# Patient Record
Sex: FEMALE | Race: WHITE | NOT HISPANIC OR LATINO | Employment: FULL TIME | URBAN - METROPOLITAN AREA
[De-identification: names, ages, dates, MRNs, and addresses within clinical notes are randomized per-mention and may not be internally consistent; named-entity substitution may affect disease eponyms.]

---

## 2018-01-25 ENCOUNTER — HOSPITAL ENCOUNTER (OUTPATIENT)
Dept: RADIOLOGY | Facility: HOSPITAL | Age: 48
Discharge: HOME | End: 2018-01-25
Attending: PHYSICIAN ASSISTANT
Payer: COMMERCIAL

## 2018-01-25 ENCOUNTER — OFFICE VISIT (OUTPATIENT)
Dept: URGENT CARE | Facility: CLINIC | Age: 48
End: 2018-01-25
Payer: COMMERCIAL

## 2018-01-25 VITALS
HEART RATE: 112 BPM | BODY MASS INDEX: 31.49 KG/M2 | WEIGHT: 189 LBS | DIASTOLIC BLOOD PRESSURE: 80 MMHG | SYSTOLIC BLOOD PRESSURE: 106 MMHG | TEMPERATURE: 99 F | HEIGHT: 65 IN | OXYGEN SATURATION: 98 %

## 2018-01-25 DIAGNOSIS — R06.02 SHORTNESS OF BREATH: Primary | ICD-10-CM

## 2018-01-25 DIAGNOSIS — R06.02 SHORTNESS OF BREATH: ICD-10-CM

## 2018-01-25 PROCEDURE — 93005 ELECTROCARDIOGRAM TRACING: CPT | Mod: S$GLB,,, | Performed by: PHYSICIAN ASSISTANT

## 2018-01-25 PROCEDURE — 3008F BODY MASS INDEX DOCD: CPT | Mod: S$GLB,,, | Performed by: PHYSICIAN ASSISTANT

## 2018-01-25 PROCEDURE — 99214 OFFICE O/P EST MOD 30 MIN: CPT | Mod: S$GLB,,, | Performed by: PHYSICIAN ASSISTANT

## 2018-01-25 PROCEDURE — 71046 X-RAY EXAM CHEST 2 VIEWS: CPT | Mod: 26,,, | Performed by: RADIOLOGY

## 2018-01-25 PROCEDURE — 99999 PR PBB SHADOW E&M-NEW PATIENT-LVL III: CPT | Mod: PBBFAC,,, | Performed by: PHYSICIAN ASSISTANT

## 2018-01-25 PROCEDURE — 93010 ELECTROCARDIOGRAM REPORT: CPT | Mod: S$GLB,,, | Performed by: INTERNAL MEDICINE

## 2018-01-25 PROCEDURE — 71046 X-RAY EXAM CHEST 2 VIEWS: CPT | Mod: TC,FY,PO

## 2018-01-25 RX ORDER — ALBUTEROL SULFATE 90 UG/1
2 AEROSOL, METERED RESPIRATORY (INHALATION) EVERY 6 HOURS PRN
Qty: 1 INHALER | Refills: 0 | Status: SHIPPED | OUTPATIENT
Start: 2018-01-25 | End: 2018-01-31

## 2018-01-26 NOTE — PROGRESS NOTES
"Subjective:      Patient ID: Jessenia Weiner is a 47 y.o. female.    Chief Complaint: Shortness of Breath (chest tightness)    Sick over the weekend- body aches, diaphoresis, fever (101/102), coughing, HA, sinus...these symptoms have since mostly resolved    Patient with + cardiac history (SVT and MI secondary to SVT)    Patient has appointment next Wednesday to establish care with Dr. Capps      Shortness of Breath   This is a new problem. The current episode started in the past 7 days (in the past 2 days). Associated symptoms include headaches (h/o TMJ, chronic). Pertinent negatives include no abdominal pain, chest pain, fever, leg swelling, rhinorrhea, sore throat, vomiting or wheezing. Exacerbated by: Smokes 1pk/day for 28yrs. There is no history of asthma, COPD or pneumonia. (Sinus infections/bronchitis)     Review of Systems   Constitutional: Positive for chills (mild) and diaphoresis (mild). Negative for fever.   HENT: Positive for sinus pain (mild) and sinus pressure (mild). Negative for congestion, rhinorrhea and sore throat.    Eyes: Negative for visual disturbance.   Respiratory: Positive for chest tightness (when taking a breath, pressure in her back) and shortness of breath. Negative for cough and wheezing.    Cardiovascular: Negative for chest pain, palpitations and leg swelling.   Gastrointestinal: Negative for abdominal pain, constipation, diarrhea, nausea and vomiting.   Neurological: Positive for headaches (h/o TMJ, chronic). Negative for dizziness, speech difficulty (trouble getting enough air to get words out), weakness, light-headedness and numbness.   Psychiatric/Behavioral: Negative for confusion. The patient is nervous/anxious.        Objective:   /80   Pulse (!) 112   Temp 99.2 °F (37.3 °C) (Tympanic)   Ht 5' 5" (1.651 m)   Wt 85.7 kg (189 lb)   LMP  (LMP Unknown)   SpO2 98%   BMI 31.45 kg/m²   Physical Exam   Constitutional: She appears well-developed and well-nourished. She does " not appear ill. No distress.   HENT:   Head: Normocephalic and atraumatic.   Right Ear: Tympanic membrane and ear canal normal. Tympanic membrane is not erythematous. No middle ear effusion.   Left Ear: Tympanic membrane and ear canal normal. Tympanic membrane is not erythematous.  No middle ear effusion.   Nose: Nose normal. Right sinus exhibits no maxillary sinus tenderness and no frontal sinus tenderness. Left sinus exhibits no maxillary sinus tenderness and no frontal sinus tenderness.   Mouth/Throat: Uvula is midline and oropharynx is clear and moist.   Cardiovascular: Normal rate, regular rhythm and normal heart sounds.    No murmur heard.  Pulmonary/Chest: Effort normal and breath sounds normal. No respiratory distress. She has no decreased breath sounds. She has no wheezes. She has no rhonchi. She has no rales.   Lymphadenopathy:     She has no cervical adenopathy.   Skin: Skin is warm and dry. No rash noted. She is not diaphoretic.   Psychiatric: Her speech is normal and behavior is normal. Thought content normal. Her mood appears anxious.     Assessment:      1. Shortness of breath       Plan:   Shortness of breath  -     X-Ray Chest PA And Lateral; Future; Expected date: 01/25/2018  -     EKG 12-lead; Future  -     albuterol 90 mcg/actuation inhaler; Inhale 2 puffs into the lungs every 6 (six) hours as needed for Wheezing.  Dispense: 1 Inhaler; Refill: 0    Fairly unremarkable EKG and CXR    Gave handout on shortness of breath.  Printed AVS and reviewed treatment plan in detail.    Discussed worsening signs/symptoms and when to return to clinic or go to ED.   Patient expresses understanding and agrees with treatment plan.

## 2018-01-26 NOTE — PATIENT INSTRUCTIONS

## 2018-01-31 ENCOUNTER — LAB VISIT (OUTPATIENT)
Dept: LAB | Facility: HOSPITAL | Age: 48
End: 2018-01-31
Attending: FAMILY MEDICINE
Payer: COMMERCIAL

## 2018-01-31 ENCOUNTER — OFFICE VISIT (OUTPATIENT)
Dept: INTERNAL MEDICINE | Facility: CLINIC | Age: 48
End: 2018-01-31
Payer: COMMERCIAL

## 2018-01-31 VITALS
SYSTOLIC BLOOD PRESSURE: 140 MMHG | HEART RATE: 84 BPM | TEMPERATURE: 99 F | BODY MASS INDEX: 31.89 KG/M2 | WEIGHT: 191.38 LBS | HEIGHT: 65 IN | DIASTOLIC BLOOD PRESSURE: 86 MMHG

## 2018-01-31 DIAGNOSIS — Z72.0 TOBACCO ABUSE: ICD-10-CM

## 2018-01-31 DIAGNOSIS — R53.83 FATIGUE, UNSPECIFIED TYPE: ICD-10-CM

## 2018-01-31 DIAGNOSIS — F33.2 SEVERE EPISODE OF RECURRENT MAJOR DEPRESSIVE DISORDER, WITHOUT PSYCHOTIC FEATURES: ICD-10-CM

## 2018-01-31 DIAGNOSIS — F41.1 GAD (GENERALIZED ANXIETY DISORDER): ICD-10-CM

## 2018-01-31 DIAGNOSIS — R06.02 SOB (SHORTNESS OF BREATH): Primary | ICD-10-CM

## 2018-01-31 DIAGNOSIS — R06.02 SHORTNESS OF BREATH: ICD-10-CM

## 2018-01-31 DIAGNOSIS — R06.02 SOB (SHORTNESS OF BREATH): ICD-10-CM

## 2018-01-31 DIAGNOSIS — R07.9 CHEST PAIN, UNSPECIFIED TYPE: ICD-10-CM

## 2018-01-31 LAB
ALBUMIN SERPL BCP-MCNC: 3.7 G/DL
ALP SERPL-CCNC: 79 U/L
ALT SERPL W/O P-5'-P-CCNC: 39 U/L
ANION GAP SERPL CALC-SCNC: 12 MMOL/L
AST SERPL-CCNC: 27 U/L
BASOPHILS # BLD AUTO: 0.07 K/UL
BASOPHILS NFR BLD: 0.9 %
BILIRUB SERPL-MCNC: 0.1 MG/DL
BUN SERPL-MCNC: 8 MG/DL
CALCIUM SERPL-MCNC: 10 MG/DL
CHLORIDE SERPL-SCNC: 105 MMOL/L
CHOLEST SERPL-MCNC: 210 MG/DL
CHOLEST/HDLC SERPL: 3 {RATIO}
CK MB SERPL-MCNC: 0.7 NG/ML
CK MB SERPL-RTO: 0.9 %
CK SERPL-CCNC: 75 U/L
CO2 SERPL-SCNC: 26 MMOL/L
CREAT SERPL-MCNC: 0.7 MG/DL
CRP SERPL-MCNC: 1.61 MG/L
DIFFERENTIAL METHOD: ABNORMAL
EOSINOPHIL # BLD AUTO: 0.2 K/UL
EOSINOPHIL NFR BLD: 2.3 %
ERYTHROCYTE [DISTWIDTH] IN BLOOD BY AUTOMATED COUNT: 12.7 %
EST. GFR  (AFRICAN AMERICAN): >60 ML/MIN/1.73 M^2
EST. GFR  (NON AFRICAN AMERICAN): >60 ML/MIN/1.73 M^2
ESTIMATED AVG GLUCOSE: 82 MG/DL
GLUCOSE SERPL-MCNC: 71 MG/DL
HBA1C MFR BLD HPLC: 4.5 %
HCT VFR BLD AUTO: 40.4 %
HDLC SERPL-MCNC: 69 MG/DL
HDLC SERPL: 32.9 %
HGB BLD-MCNC: 13.6 G/DL
IMM GRANULOCYTES # BLD AUTO: 0.02 K/UL
IMM GRANULOCYTES NFR BLD AUTO: 0.3 %
LDLC SERPL CALC-MCNC: 95.8 MG/DL
LYMPHOCYTES # BLD AUTO: 2.7 K/UL
LYMPHOCYTES NFR BLD: 35.8 %
MCH RBC QN AUTO: 31.7 PG
MCHC RBC AUTO-ENTMCNC: 33.7 G/DL
MCV RBC AUTO: 94 FL
MONOCYTES # BLD AUTO: 0.9 K/UL
MONOCYTES NFR BLD: 11.9 %
NEUTROPHILS # BLD AUTO: 3.6 K/UL
NEUTROPHILS NFR BLD: 48.8 %
NONHDLC SERPL-MCNC: 141 MG/DL
NRBC BLD-RTO: 0 /100 WBC
PLATELET # BLD AUTO: 362 K/UL
PMV BLD AUTO: 11.2 FL
POTASSIUM SERPL-SCNC: 3.6 MMOL/L
PROT SERPL-MCNC: 7.1 G/DL
RBC # BLD AUTO: 4.29 M/UL
SODIUM SERPL-SCNC: 143 MMOL/L
T4 FREE SERPL-MCNC: 0.85 NG/DL
TRIGL SERPL-MCNC: 226 MG/DL
TSH SERPL DL<=0.005 MIU/L-ACNC: 1.93 UIU/ML
WBC # BLD AUTO: 7.41 K/UL

## 2018-01-31 PROCEDURE — 80053 COMPREHEN METABOLIC PANEL: CPT

## 2018-01-31 PROCEDURE — 83036 HEMOGLOBIN GLYCOSYLATED A1C: CPT

## 2018-01-31 PROCEDURE — 99999 PR PBB SHADOW E&M-EST. PATIENT-LVL III: CPT | Mod: PBBFAC,,, | Performed by: FAMILY MEDICINE

## 2018-01-31 PROCEDURE — 80061 LIPID PANEL: CPT

## 2018-01-31 PROCEDURE — 36415 COLL VENOUS BLD VENIPUNCTURE: CPT | Mod: PO

## 2018-01-31 PROCEDURE — 84439 ASSAY OF FREE THYROXINE: CPT

## 2018-01-31 PROCEDURE — 84443 ASSAY THYROID STIM HORMONE: CPT

## 2018-01-31 PROCEDURE — 3008F BODY MASS INDEX DOCD: CPT | Mod: S$GLB,,, | Performed by: FAMILY MEDICINE

## 2018-01-31 PROCEDURE — 85025 COMPLETE CBC W/AUTO DIFF WBC: CPT

## 2018-01-31 PROCEDURE — 82553 CREATINE MB FRACTION: CPT

## 2018-01-31 PROCEDURE — 99215 OFFICE O/P EST HI 40 MIN: CPT | Mod: S$GLB,,, | Performed by: FAMILY MEDICINE

## 2018-01-31 PROCEDURE — 86141 C-REACTIVE PROTEIN HS: CPT

## 2018-01-31 RX ORDER — BUPROPION HYDROCHLORIDE 300 MG/1
300 TABLET ORAL DAILY
Qty: 30 TABLET | Refills: 11 | Status: SHIPPED | OUTPATIENT
Start: 2018-02-07 | End: 2019-02-07

## 2018-01-31 RX ORDER — LORAZEPAM 0.5 MG/1
0.5 TABLET ORAL EVERY 12 HOURS PRN
Qty: 40 TABLET | Refills: 0 | Status: SHIPPED | OUTPATIENT
Start: 2018-01-31 | End: 2018-03-08 | Stop reason: SDUPTHER

## 2018-01-31 RX ORDER — BUPROPION HYDROCHLORIDE 150 MG/1
150 TABLET ORAL DAILY
Qty: 7 TABLET | Refills: 0 | Status: SHIPPED | OUTPATIENT
Start: 2018-01-31 | End: 2018-02-07

## 2018-01-31 NOTE — PROGRESS NOTES
Subjective:      Patient ID: Jessenia Weiner is a 47 y.o. female.    Chief Complaint: Establish Care; Medication Refill; and Follow-up (from  - for chest tightness and SOB)    Disclaimer:  This note is prepared using voice recognition software and as such is likely to have errors and has not been proof read. Please contact me for questions.     Jessenia Weiner is a 47 y.o. female who presents today to establish care.  She was recently seen in urgent care on the  due to some shortness of breath and chest discomfort.  He was right after a significant event of dealing with having to fire someone that was very stressful.  She does not have a current PCP.  She moved from [place] about 3 years ago.  From Dr. Carrasco in [place] for about 2 years now she's currently working in [employer].  She's the  per Luv Rink.  She's been referred by another patient of mine.    She has a history of anxiety and depression.  She's been on multiple agents that the most recent regimen with Cymbalta at 90 mg and Wellbutrin 300 mg XL.  She does have a history of SVT as well which she's had 2 episodes one in  another in .  She also had 2 cardiac ablations performed.  At one point she was placed on metoprolol but this made her very sleepy.  Her   right before the episode in .  She doesn't really have any copies of her records.  She states she lost all of it in her home in [place].  She does not have a current cardiologist.    She reports her mother is a psychologist but she does not speak with her.  Her father's in [place] but she doesn't really have a greatly strep with him either.  She has really no support system ever since her  .  This becomes difficult for her.  She feels [place] she is overly anxious always stressed with a high stress job and depressed as well.  She feels like she needs something for the anxiety as well as for the depression.  They did do an EKG on  "the 25th which showed some left atrial enlargement which would go along with some of her SVT issues and evidence of a possible old MI.  She does report that this was noted before on EKG but she's never actually had a stress test.  She is a smoker receive one pack per day but now working down to half a pack per day.  She would be interested in quitting at some point but she's not currently.  She hasn't had any blood work performed of cholesterol levels her thyroid studies either.  She's uncertain if she's diabetic.  She basically is willing to do whatever it takes to get her to feel "normal again".  She is not suicidal or homicidal.        No results found for: WBC, HGB, HCT, PLT, CHOL, TRIG, HDL, LDLDIRECT, ALT, AST, NA, K, CL, CREATININE, BUN, CO2, TSH, PSA, INR, GLUF, HGBA1C, MICROALBUR    Review of Systems   Constitutional: Positive for fatigue. Negative for activity change and unexpected weight change.   HENT: Negative for congestion, hearing loss, rhinorrhea and trouble swallowing.    Eyes: Negative for discharge and visual disturbance.   Respiratory: Positive for chest tightness and shortness of breath. Negative for wheezing.    Cardiovascular: Positive for chest pain. Negative for palpitations.   Gastrointestinal: Negative for blood in stool, constipation, diarrhea and vomiting.   Endocrine: Negative for polydipsia and polyuria.   Genitourinary: Negative for difficulty urinating, dysuria, hematuria and menstrual problem.   Musculoskeletal: Positive for arthralgias. Negative for joint swelling and neck pain.   Neurological: Positive for headaches. Negative for weakness.   Psychiatric/Behavioral: Positive for agitation, dysphoric mood and sleep disturbance. Negative for confusion. The patient is nervous/anxious.      Objective:     Vitals:    01/31/18 1308   BP: (!) 140/86   Pulse: 84   Temp: 99.4 °F (37.4 °C)   TempSrc: Tympanic   Weight: 86.8 kg (191 lb 5.8 oz)   Height: 5' 5" (1.651 m)     Physical Exam "   Constitutional: She appears well-developed and well-nourished.   HENT:   Head: Normocephalic and atraumatic.   Right Ear: Tympanic membrane normal.   Left Ear: Tympanic membrane normal.   Mouth/Throat: Oropharynx is clear and moist.   Eyes: Conjunctivae and EOM are normal.   Neck: Normal range of motion. Neck supple.   Cardiovascular: Normal rate and regular rhythm.    Pulmonary/Chest: Effort normal and breath sounds normal. She has no wheezes.   Psychiatric: Her speech is normal and behavior is normal. Judgment and thought content normal. Her mood appears anxious. Cognition and memory are normal. She exhibits a depressed mood.   Nursing note and vitals reviewed.    Assessment:     1. SOB (shortness of breath)    2. Chest pain, unspecified type    3. MALIK (generalized anxiety disorder)    4. Severe episode of recurrent major depressive disorder, without psychotic features    5. Fatigue, unspecified type    6. Tobacco abuse    7. Shortness of breath      Plan:   Jessenia was seen today for establish care, medication refill and follow-up.    Diagnoses and all orders for this visit:    SOB (shortness of breath) new, needing workup.  Previous workup done at the urgent care was chest x-ray and EKG.  Chest x-ray showed no active infiltrates or acute issues.  EKG showed left atrial enlargement and an old MI.  At this time we'll go ahead and obtain additional lab work to rule out thyroid anemia electrolyte disturbances.  We'll also obtain high sensitivity CRP.  We'll perform nuclear stress test that she's at high risk for possible coronary artery disease.  Based on the results we'll follow her up and determine if she needs to proceed forward with cardiology.  In the meantime for some the anxiety related to the stress for the shortest breath I will give her Ativan to use up to twice daily.  We'll get her back started on her Wellbutrin.  -     TSH; Future  -     T4, free; Future  -     Cancel: Microalbumin/creatinine urine  ratio  -     Lipid panel; Future  -     Hemoglobin A1c; Future  -     Comprehensive metabolic panel; Future  -     CBC auto differential; Future  -     High sensitivity CRP (Cardiac CRP); Future  -     CK-MB; Future  -     NM Multi Pharm Stress Cardiac Component; Future  -     Cancel: Microalbumin/creatinine urine ratio  -     Microalbumin/creatinine urine ratio; Future    Chest pain, unspecified type-new ongoing.  History of an old MI.  Obtain lab work.  Set up stress test due to her history of an abnormal EKG.  Recommend a cardiology however she like to hold off and like to get the lab results back first and see the stress results first.  Also suspected component of anxiety  -     TSH; Future  -     T4, free; Future  -     Cancel: Microalbumin/creatinine urine ratio  -     Lipid panel; Future  -     Hemoglobin A1c; Future  -     Comprehensive metabolic panel; Future  -     CBC auto differential; Future  -     High sensitivity CRP (Cardiac CRP); Future  -     CK-MB; Future  -     NM Multi Pharm Stress Cardiac Component; Future  -     Cancel: Microalbumin/creatinine urine ratio  -     Microalbumin/creatinine urine ratio; Future    MALIK (generalized anxiety disorder)-known history of worse lately with high stress job.  Restart Wellbutrin initially 150 for one week then increase up to 300 mg.  Provided Ativan 0.5 mg tablets up to twice a day dosing for now.  She may also need to get back on Cymbalta but she reports that it was expensive.  Could consider an alternative such as Effexor or Lexapro.  Patient will message me to the patient portal once labs are received.  -     TSH; Future  -     T4, free; Future  -     Cancel: Microalbumin/creatinine urine ratio  -     Lipid panel; Future  -     Hemoglobin A1c; Future  -     Comprehensive metabolic panel; Future  -     CBC auto differential; Future  -     High sensitivity CRP (Cardiac CRP); Future  -     CK-MB; Future  -     NM Multi Pharm Stress Cardiac Component;  Future  -     Cancel: Microalbumin/creatinine urine ratio  -     Microalbumin/creatinine urine ratio; Future    Severe episode of recurrent major depressive disorder, without psychotic features-worse lately high stress jobmay just support system.  Recommend counselor.  Obtain lab work.  Restart Wellbutrin.  Consideration of adding back either Cymbalta or Lexapro or Effexor  -     TSH; Future  -     T4, free; Future  -     Cancel: Microalbumin/creatinine urine ratio  -     Lipid panel; Future  -     Hemoglobin A1c; Future  -     Comprehensive metabolic panel; Future  -     CBC auto differential; Future  -     High sensitivity CRP (Cardiac CRP); Future  -     CK-MB; Future  -     NM Multi Pharm Stress Cardiac Component; Future  -     Cancel: Microalbumin/creatinine urine ratio  -     Microalbumin/creatinine urine ratio; Future    Fatigue, unspecified type-new needing workup obtain labs discussed high stress job.  Rule out heart attack events.  -     TSH; Future  -     T4, free; Future  -     Cancel: Microalbumin/creatinine urine ratio  -     Lipid panel; Future  -     Hemoglobin A1c; Future  -     Comprehensive metabolic panel; Future  -     CBC auto differential; Future  -     High sensitivity CRP (Cardiac CRP); Future  -     CK-MB; Future  -     NM Multi Pharm Stress Cardiac Component; Future  -     Cancel: Microalbumin/creatinine urine ratio  -     Microalbumin/creatinine urine ratio; Future    Tobacco abuse-patient needing to quit smoking.  We'll restart Wellbutrin.  Currently gone down from 1 pack to a half a pack.  But at higher risk for coronary artery events.  -     TSH; Future  -     T4, free; Future  -     Cancel: Microalbumin/creatinine urine ratio  -     Lipid panel; Future  -     Hemoglobin A1c; Future  -     Comprehensive metabolic panel; Future  -     CBC auto differential; Future  -     High sensitivity CRP (Cardiac CRP); Future  -     CK-MB; Future  -     NM Multi Pharm Stress Cardiac Component;  Future  -     Cancel: Microalbumin/creatinine urine ratio  -     Microalbumin/creatinine urine ratio; Future      Other orders  -     buPROPion (WELLBUTRIN XL) 150 MG TB24 tablet; Take 1 tablet (150 mg total) by mouth once daily.  -     buPROPion (WELLBUTRIN XL) 300 MG 24 hr tablet; Take 1 tablet (300 mg total) by mouth once daily.  -     LORazepam (ATIVAN) 0.5 MG tablet; Take 1 tablet (0.5 mg total) by mouth every 12 (twelve) hours as needed for Anxiety.      Time spent: 45 minutes in face to face discussion concerning diagnosis, prognosis, review of lab and test results, benefits of treatment as well as management of disease, counseling of patient and coordination of care between various health care providers . Greater than half the time spent was used for coordination of care and counseling of patient.         Follow-up in about 5 weeks (around 3/7/2018), or meds, anxiety, labs. .

## 2018-02-01 ENCOUNTER — TELEPHONE (OUTPATIENT)
Dept: INTERNAL MEDICINE | Facility: CLINIC | Age: 48
End: 2018-02-01

## 2018-02-01 NOTE — TELEPHONE ENCOUNTER
Patient was here to est care with  I called to follow up and patient expressed a great visit and is highly likely to refer Dr. Capps to others.aa

## 2018-02-26 ENCOUNTER — PATIENT OUTREACH (OUTPATIENT)
Dept: ADMINISTRATIVE | Facility: HOSPITAL | Age: 48
End: 2018-02-26

## 2018-03-08 ENCOUNTER — OFFICE VISIT (OUTPATIENT)
Dept: INTERNAL MEDICINE | Facility: CLINIC | Age: 48
End: 2018-03-08
Payer: COMMERCIAL

## 2018-03-08 VITALS
DIASTOLIC BLOOD PRESSURE: 88 MMHG | TEMPERATURE: 99 F | SYSTOLIC BLOOD PRESSURE: 120 MMHG | HEIGHT: 65 IN | HEART RATE: 86 BPM | WEIGHT: 191.56 LBS | BODY MASS INDEX: 31.92 KG/M2

## 2018-03-08 DIAGNOSIS — Z23 NEED FOR PNEUMOCOCCAL VACCINATION: ICD-10-CM

## 2018-03-08 DIAGNOSIS — Z23 NEED FOR TDAP VACCINATION: ICD-10-CM

## 2018-03-08 DIAGNOSIS — F33.1 MODERATE EPISODE OF RECURRENT MAJOR DEPRESSIVE DISORDER: ICD-10-CM

## 2018-03-08 DIAGNOSIS — F41.1 GAD (GENERALIZED ANXIETY DISORDER): ICD-10-CM

## 2018-03-08 DIAGNOSIS — Z00.00 ROUTINE GENERAL MEDICAL EXAMINATION AT A HEALTH CARE FACILITY: Primary | ICD-10-CM

## 2018-03-08 DIAGNOSIS — Z72.0 TOBACCO ABUSE: ICD-10-CM

## 2018-03-08 PROCEDURE — 99396 PREV VISIT EST AGE 40-64: CPT | Mod: 25,S$GLB,, | Performed by: FAMILY MEDICINE

## 2018-03-08 PROCEDURE — 90471 IMMUNIZATION ADMIN: CPT | Mod: S$GLB,,, | Performed by: FAMILY MEDICINE

## 2018-03-08 PROCEDURE — 90715 TDAP VACCINE 7 YRS/> IM: CPT | Mod: S$GLB,,, | Performed by: FAMILY MEDICINE

## 2018-03-08 PROCEDURE — 90732 PPSV23 VACC 2 YRS+ SUBQ/IM: CPT | Mod: S$GLB,,, | Performed by: FAMILY MEDICINE

## 2018-03-08 PROCEDURE — 90472 IMMUNIZATION ADMIN EACH ADD: CPT | Mod: S$GLB,,, | Performed by: FAMILY MEDICINE

## 2018-03-08 PROCEDURE — 99999 PR PBB SHADOW E&M-EST. PATIENT-LVL III: CPT | Mod: PBBFAC,,, | Performed by: FAMILY MEDICINE

## 2018-03-08 RX ORDER — LORAZEPAM 0.5 MG/1
0.5 TABLET ORAL EVERY 12 HOURS PRN
Qty: 60 TABLET | Refills: 5 | Status: SHIPPED | OUTPATIENT
Start: 2018-03-08

## 2018-03-08 NOTE — PROGRESS NOTES
Subjective:      Patient ID: Jsesenia Weiner is a 47 y.o. female.    Chief Complaint: Follow-up (labs, sob, chest pain) and Anxiety    Disclaimer:  This note is prepared using voice recognition software and as such is likely to have errors and has not been proof read. Please contact me for questions.     Patient's coming in today for follow-up since her last visit which was 5 weeks ago.  At that time she was having a lot of shortness of breath and anxiety issues and chest pain.  At that time she reports she had a known history of anxiety and depression her entire life but recently had gotten much worse.  She is under a lot of high stress events.  She's also a chronic smoker 1 pack per day.  At that time we did do blood work which an EKG and chest x-ray.  Chest x-ray showed no evidence of infiltrates or infection no fluid in the lungs.  EKG showed normal sinus rhythm other than mildly left atrial enlargement noted.  Blood work showed normal cholesterol other than slightly elevated triglycerides which at the time the lab work was not fasting.  Kidney function liver test blood counts were normal except for mildly elevated platelets in the upper 350s which is most likely consistent with her tobacco use.  High-sensitivity CRP was normal.  Cardiac enzymes were normal.  The patient will like to hold off on doing any type of nuclear stress testing because she states she can't financially afford it.  She also like to hold off on doing any urine testing.  She does find that her depression has improved with using the Wellbutrin at 300 mg.  The anxiety has gotten much better with the use of Ativan.  Initially she did the one pill twice a day and then moved to just once daily.  She finds the Ativan helped in the morning but it seems to wear off after 4:00.  She would be interested if she could have something else to help with anxiety.  She mainly would like to stick with the Ativan but just see if it can last longer.  We did  discuss potential of her just using it twice a day which I'm okay with.  She's interested also weight loss but she reports mainly she eats out of boredom.  She would not be a candidate at this time for the phentermine due to the anxiety and the smoking history.  In the past she states she's been on higher doses of Wellbutrin also but at this time she's early on finding out if the 300 mg will be enough.  We also did discuss contrary but at this time I think if she can improve with her depression and anxiety and get more motivated with her goals of possibly starting school she'll do better as well.  She sounded like this appeared to be a good plan for her.    From a prevention standpoint she does need a Pap smear mammogram.  She wants to hold off on doing a Pap smear today as she states her last was in 2010.  At that time it was abnormal and she's had a colposcopy and freezing and even uterine ablations.  She's not had one since.  She's also due for mammogram.  She like this can see about scheduling these in the future.  She is willing to do her pneumonia shot today being a smoker and her T dap.        Lab Results   Component Value Date    WBC 7.41 01/31/2018    HGB 13.6 01/31/2018    HCT 40.4 01/31/2018     (H) 01/31/2018    CHOL 210 (H) 01/31/2018    TRIG 226 (H) 01/31/2018    HDL 69 01/31/2018    ALT 39 01/31/2018    AST 27 01/31/2018     01/31/2018    K 3.6 01/31/2018     01/31/2018    CREATININE 0.7 01/31/2018    BUN 8 01/31/2018    CO2 26 01/31/2018    TSH 1.929 01/31/2018    HGBA1C 4.5 01/31/2018       Review of Systems   Constitutional: Negative for activity change and unexpected weight change.   HENT: Negative for hearing loss, rhinorrhea and trouble swallowing.    Eyes: Negative for discharge and visual disturbance.   Respiratory: Positive for chest tightness. Negative for wheezing.    Cardiovascular: Negative for chest pain and palpitations.   Gastrointestinal: Negative for blood in  "stool, constipation, diarrhea and vomiting.   Endocrine: Negative for polydipsia and polyuria.   Genitourinary: Negative for difficulty urinating, dysuria, hematuria and menstrual problem.   Musculoskeletal: Negative for arthralgias, joint swelling and neck pain.   Neurological: Positive for headaches. Negative for weakness.   Psychiatric/Behavioral: Positive for dysphoric mood. Negative for confusion.     Objective:     Vitals:    03/08/18 1451   BP: 120/88   Pulse: 86   Temp: 98.9 °F (37.2 °C)   Weight: 86.9 kg (191 lb 9.3 oz)   Height: 5' 5" (1.651 m)     Physical Exam   Constitutional: She appears well-developed and well-nourished.   HENT:   Head: Normocephalic and atraumatic.   Right Ear: Tympanic membrane normal.   Left Ear: Tympanic membrane normal.   Mouth/Throat: Oropharynx is clear and moist.   Eyes: Conjunctivae and EOM are normal.   Neck: Normal range of motion. Neck supple.   Cardiovascular: Normal rate and regular rhythm.    Pulmonary/Chest: Effort normal and breath sounds normal.   Abdominal: Soft. Bowel sounds are normal. She exhibits no distension and no mass. There is no tenderness. There is no guarding.   Psychiatric: Her behavior is normal. Her mood appears anxious. Her speech is rapid and/or pressured. Cognition and memory are normal.   Nursing note and vitals reviewed.    Assessment:     1. Routine general medical examination at a health care facility    2. MALIK (generalized anxiety disorder)    3. Moderate episode of recurrent major depressive disorder    4. Need for pneumococcal vaccination    5. Need for Tdap vaccination      Plan:   Jessenia was seen today for follow-up and anxiety.    Diagnoses and all orders for this visit:    Routine general medical examination at a health care facility-labs reviewed discussed health maintenance issues she will schedule her mammogram.  We'll schedule her Pap smear for about 5 months.  She'll get her pneumonia vaccine and her T dap vaccine today.  She wants to " cancel the nuclear stress and the urine testing today mainly due to cost.    MALIK (generalized anxiety disorder)-increase Ativan to twice a day dosing continue with Wellbutrin 300 mg.  Follow-up in 4 weeks.  The patient portal.    Moderate episode of recurrent major depressive disorder-improved with Wellbutrin 300 mg follow-up the patient portal in 4 weeks.  If noted to still be more depressed at that time could consider increasing to 450    Need for pneumococcal vaccination-update today    Need for Tdap vaccination-update today due to the patient working as a     Tobacco abuse-patient's may be interested in working on quitting smoking however at this time she's been a hold off just trying to cut back some.    Other orders  -     LORazepam (ATIVAN) 0.5 MG tablet; Take 1 tablet (0.5 mg total) by mouth every 12 (twelve) hours as needed for Anxiety.  -     Pneumococcal Polysaccharide Vaccine (23 Valent) (SQ/IM)  -     (In Office Administered) Tdap Vaccine            Follow-up in about 5 months (around 8/8/2018) for pap and med refills.

## 2018-06-22 DIAGNOSIS — Z12.39 BREAST CANCER SCREENING: ICD-10-CM

## 2019-09-20 ENCOUNTER — PATIENT OUTREACH (OUTPATIENT)
Dept: ADMINISTRATIVE | Facility: HOSPITAL | Age: 49
End: 2019-09-20

## 2019-10-16 ENCOUNTER — TELEPHONE (OUTPATIENT)
Dept: ADMINISTRATIVE | Facility: HOSPITAL | Age: 49
End: 2019-10-16

## 2019-10-16 DIAGNOSIS — Z12.39 BREAST CANCER SCREENING: ICD-10-CM

## 2019-10-16 NOTE — TELEPHONE ENCOUNTER
Attempted to contact patient to schedule annual visit with PCP Dr Maria Alejandra Capps, voicemail left for patient to call back to schedule annual exam with PCP.Cheryl

## 2020-10-06 ENCOUNTER — PATIENT MESSAGE (OUTPATIENT)
Dept: ADMINISTRATIVE | Facility: HOSPITAL | Age: 50
End: 2020-10-06

## 2021-04-29 ENCOUNTER — PATIENT MESSAGE (OUTPATIENT)
Dept: RESEARCH | Facility: HOSPITAL | Age: 51
End: 2021-04-29